# Patient Record
Sex: FEMALE | Race: WHITE | NOT HISPANIC OR LATINO | Employment: FULL TIME | URBAN - METROPOLITAN AREA
[De-identification: names, ages, dates, MRNs, and addresses within clinical notes are randomized per-mention and may not be internally consistent; named-entity substitution may affect disease eponyms.]

---

## 2023-09-21 ENCOUNTER — HOSPITAL ENCOUNTER (EMERGENCY)
Facility: CLINIC | Age: 38
Discharge: HOME | End: 2023-09-21
Attending: NURSE PRACTITIONER | Admitting: NURSE PRACTITIONER
Payer: COMMERCIAL

## 2023-09-21 VITALS
HEART RATE: 82 BPM | TEMPERATURE: 98.2 F | OXYGEN SATURATION: 98 % | SYSTOLIC BLOOD PRESSURE: 120 MMHG | DIASTOLIC BLOOD PRESSURE: 84 MMHG | RESPIRATION RATE: 16 BRPM

## 2023-09-21 DIAGNOSIS — H65.03 NON-RECURRENT ACUTE SEROUS OTITIS MEDIA OF BOTH EARS: ICD-10-CM

## 2023-09-21 PROCEDURE — G0463 HOSPITAL OUTPT CLINIC VISIT: HCPCS | Performed by: NURSE PRACTITIONER

## 2023-09-21 PROCEDURE — 99213 OFFICE O/P EST LOW 20 MIN: CPT | Performed by: NURSE PRACTITIONER

## 2023-09-21 ASSESSMENT — ACTIVITIES OF DAILY LIVING (ADL): ADLS_ACUITY_SCORE: 35

## 2023-09-21 NOTE — ED PROVIDER NOTES
"ID:   Tierney Egan      CC:   Otalgia       HPI:  Tierney Egan is a 37 year old female with complaints of: ear pain. Pain localized to bilateral ears. Started about 4 days ago after a flight and the bilateral ears have been having a sense of \"fullness\" since. This is not associated with any trauma to the ear, blunt, serenity, fb insertion or otherwise. Associated symptoms include: pain while she was flying but none since. Preceding this she had a mild cold. Drainage is not present. Tinnitus is present. Denies any: measured fevers, vomiting or vertigo. Hearing does not appear to be adversely affected, slightly diminished. Self care to this point includes: heat packs, peroxide, blowing ears and many other techniques. Patient does not have tubes in place and has no known chronic ear conditions.      Triage Note:   Pt reports recent flight on 9/17/23 and has had bilateral ear fullness since.      I have reviewed the PMH, Meds, Allergies, SH, and FH, including:    PAST MEDICAL HISTORY:  No known chronic ear conditions      FAMILY MEDICAL HISTORY:  Reviewed and non-contributory to presenting complaint.      MEDICATIONS:  None      ALLERGIES:   Morphine Hives       ROS:  As per HPI. All other systems reviewed and appear to be negative.      PHYSICAL EXAM:  Blood pressure 120/84, pulse 82, temperature 98.2  F (36.8  C), temperature source Oral, resp. rate 16, SpO2 98 %.  GENERAL APPEARANCE: Healthy; alert; no acute distress  SKIN: Normal without rashes  HEAD: Atraumatic; normocephalic; without lesions  EARS: External ears normal; ear canals normal; TM on left and TM on right both slightly bulging with serous fluid present; no erythema; not ttp along the mastoids bilaterally  NOSE: Nares normal; septum midline; mucosa normal      EMERGENCY DEPARTMENT COURSE/ MDM:  Tierney Egan is a 37 year old female whom presented to ED with ear pain. Vitals upon arrival wnl. History obtained and exam completed. Previous " records were reviewed. Will treat as (H65.03) Non-recurrent acute serous otitis media of both ears and encouraged sudafed and reassurance. Given reassuring exam, VS, and lack of associated symptoms, will allow to d/c to home.     Impression and plan discussed with patient. Questions answered, concerns addressed, indications for urgent re-evaluation reviewed, and  given. Patient and parent/caregiver agree with treatment plan and have no further questions at this time. Patient discharged in good condition. Was given [facility] discharge instructions and follow-up recommendations. Patient will return to the ED if their symptoms worsen or they develop any of the concerning signs/symptoms as outlined in the EPIC d/c instructions. Otherwise, patient will follow up with primary care provider as directed in discharge summary.      ASSESSMENT:  Non-recurrent acute serous otitis media of both ears      PLAN:  Use ibuprofen and tylenol as needed for pain  If fevers develop, return to seek medical care  If no improvement beyond 3 days, return to see primary care provider for follow-up  Sometimes the fluid behind the ear can be assisted with removal by using decongestants such as pseudoephedrine         Crissy Sarah NP  09/21/23 [id number]

## 2023-09-21 NOTE — DISCHARGE INSTRUCTIONS
Use ibuprofen and tylenol as needed for pain  Sometimes the fluid behind the ear can be assisted with removal by using decongestants such as pseudoephedrine (I.e. Sudafed)  Your body will eventually re-absorb this fluid on its own, but it may take several weeks and you might feel a little frustrated in the meantime.   The only definitive option for really resolving this, that must be then balanced by the risks of such procedure, would be a Myringotomy where the ENT specialist creates a tiny hold in the ear drum and tries to suction the contents out. Most of the time, this is not necessary because the body should reabsorb this fluid eventually on its own  Return for medical revaluation sooner if you develop bloody discharge from the ears, fevers, worsening of ear pain, vertigo, uncontrollable vomiting or other changes from your current condition that are concerning

## 2024-02-01 ENCOUNTER — MYC MEDICAL ADVICE (OUTPATIENT)
Dept: FAMILY MEDICINE | Facility: CLINIC | Age: 39
End: 2024-02-01
Payer: COMMERCIAL

## 2024-02-01 NOTE — TELEPHONE ENCOUNTER
Please call patient . I do not do weight management . She should schedule with Brittany for this. Thank you Jennifer Fischer M.D.

## 2024-02-02 NOTE — TELEPHONE ENCOUNTER
Was unable to reach patient as their number is inactive and they have no MyChart either.   Went to try calling spouse as a last resort- but did not want to leave msg on vm regarding pt.   Is there another way that you would want us to try? Could we possibly try sending a letter as a last resort?